# Patient Record
Sex: FEMALE | Race: WHITE | NOT HISPANIC OR LATINO | URBAN - METROPOLITAN AREA
[De-identification: names, ages, dates, MRNs, and addresses within clinical notes are randomized per-mention and may not be internally consistent; named-entity substitution may affect disease eponyms.]

---

## 2017-09-01 ENCOUNTER — EMERGENCY (EMERGENCY)
Facility: HOSPITAL | Age: 62
LOS: 1 days | Discharge: HOME | End: 2017-09-01
Attending: EMERGENCY MEDICINE | Admitting: EMERGENCY MEDICINE
Payer: COMMERCIAL

## 2017-09-01 VITALS
DIASTOLIC BLOOD PRESSURE: 73 MMHG | OXYGEN SATURATION: 98 % | TEMPERATURE: 98 F | HEART RATE: 99 BPM | RESPIRATION RATE: 18 BRPM | SYSTOLIC BLOOD PRESSURE: 115 MMHG

## 2017-09-01 PROCEDURE — 99283 EMERGENCY DEPT VISIT LOW MDM: CPT

## 2017-09-01 NOTE — ED ADULT NURSE NOTE - OBJECTIVE STATEMENT
62y female presents to the ER c/o left ankle pain. Pt is a&ox4 and speaking coherently. Pt denies trauma to the area, slight redness noted to medial area of ankle. Pt can walk on ankle with pain. Pt denies tingling or numbness, positive pulses in extremities. safety maintained.

## 2017-09-02 VITALS
OXYGEN SATURATION: 97 % | RESPIRATION RATE: 18 BRPM | HEART RATE: 72 BPM | DIASTOLIC BLOOD PRESSURE: 75 MMHG | SYSTOLIC BLOOD PRESSURE: 115 MMHG | TEMPERATURE: 98 F

## 2017-09-02 RX ORDER — CEPHALEXIN 500 MG
1 CAPSULE ORAL
Qty: 28 | Refills: 0 | OUTPATIENT
Start: 2017-09-02 | End: 2017-09-09

## 2017-09-02 RX ORDER — CEPHALEXIN 500 MG
500 CAPSULE ORAL ONCE
Qty: 0 | Refills: 0 | Status: COMPLETED | OUTPATIENT
Start: 2017-09-02 | End: 2017-09-02

## 2017-09-02 RX ORDER — AZTREONAM 2 G
1 VIAL (EA) INJECTION
Qty: 14 | Refills: 0 | OUTPATIENT
Start: 2017-09-02 | End: 2017-09-09

## 2017-09-02 RX ADMIN — Medication 1 TABLET(S): at 00:52

## 2017-09-02 RX ADMIN — Medication 500 MILLIGRAM(S): at 00:52

## 2017-09-02 NOTE — ED PROVIDER NOTE - OBJECTIVE STATEMENT
61yo female pt, ambulatory, no PMHx c/o right buttock abscess for 3days and noticed left ankle pain/ red since last night. Pt stated she had the same buttock infection (was told skin infection by PMD) last year and Txed. Denies fever, chills or recent cold symptoms. Denies CP/SOB/ABD pain or N/V/D. Denies injuries or bites. Denies sensory changes or weakness to extremities.

## 2017-09-02 NOTE — ED PROVIDER NOTE - ATTENDING CONTRIBUTION TO CARE
Attending MD Yates: I personally have seen and examined this patient.  NP note reviewed and agree on plan of care and except where noted.  See below for details.     62F with no reported PMH presents to the ED with R buttock abscess and L ankle pain.  Reports that she had previous episode of R buttock abscess last year and was treated by PMD.  Reports symptoms started three days ago.  Also reports has L medial ankle pain.  Able to ambulate.  Reports pain with pressure type movement.  Denies stings or bites, recent travel.  Denies fevers, chills, dizziness, weakness.  Denies trauma. Denies chest pain, shortness of breath, palpitations. Denies abdominal pain, nausea, vomiting, diarrhea, blood in stools. +tobacco.  On exam, NAD, head NCAT, PERRL, FROM at neck, no tenderness to palpation or stepoffs along length of spine, lungs CTAB with good inspiratory effort, +S1S2, no m/r/g, abdomen soft with +BS, NT, ND, no CVAT, moving all extremities with 5/5 strength bilateral upper and lower extremities, good and equal  strength bilaterally, + R buttock with induration, no fluctuance, +erythema, smaller areas of induration near main area of induration all with blanching erythema and warmth, no drainable collection palpated, +L medial ankle with area of ?excoriation, small area of cellulitis, FROM at L ankle, no fluctuance, +blanching erythema; A/P: 62F with cellulitis of L ankle and R buttock, will discharge with Keflex and Bactrim, to return for close follow up for wound check, Follow up instructions given, importance of follow up emphasized, return to ED parameters reviewed and patient verbalized understanding.  All questions answered, all concerns addressed.

## 2017-09-02 NOTE — ED PROVIDER NOTE - PHYSICAL EXAMINATION
NAD, VSS, Afebrile, ABD soft, no CVA tender, + Right buttock abscess with firm induration, Multiple small pusy rashes, not fluctuating, cellulitis around wound. no rectal tender, + left medial ankle superficial cellulitis, tender, warmth with intact skin, no induration or obvious swelling.

## 2017-09-03 ENCOUNTER — EMERGENCY (EMERGENCY)
Facility: HOSPITAL | Age: 62
LOS: 1 days | Discharge: HOME | End: 2017-09-03
Admitting: EMERGENCY MEDICINE
Payer: COMMERCIAL

## 2017-09-03 VITALS
RESPIRATION RATE: 18 BRPM | DIASTOLIC BLOOD PRESSURE: 72 MMHG | SYSTOLIC BLOOD PRESSURE: 116 MMHG | TEMPERATURE: 98 F | HEART RATE: 75 BPM | OXYGEN SATURATION: 95 %

## 2017-09-03 NOTE — ED ADULT NURSE NOTE - OBJECTIVE STATEMENT
62y female pt arrived to ED for right buttock wound check. Abscess was drained on 9/1/17 and no bacteria growth was found from culture. No fever/chills, no drainage or bleeding noted at site, no redness. Pt also states that she found some redness noted on left ankle. No pain/discomfort.

## 2017-09-03 NOTE — ED PROVIDER NOTE - ATTENDING CONTRIBUTION TO CARE
ATTENDING MD: I, Gaston Dumont, have seen and evaluated this patient with the advance practice clinician.  I have discussed the history, exam ,and aspects of care with the APC.  I have reviewed the APC's note. I agree with the findings  unless other wise stated in the HPI, PE, MDM, and progress notes.    GEN: well appearing  HEENT: mucus membranes are moist, oropharynx is within normal limits  CV: normal rate, regular rhythm  SKIN: R-buttock wound healing well, minimal erythema, no drainage. L-ankle erythema decreased from marker point, minimally warm, nontender    MDM: improving signs of infection c/w current antibiotic course, f/u with PCP.

## 2017-09-03 NOTE — ED PROVIDER NOTE - OBJECTIVE STATEMENT
63yo female pt, ambulatory c/o wound check for right buttock abscess/ cellulitis and left ankle cellulitis. Pt's seen in ED and on Bactrim/ Keflex. Pt stated feeling better and pain's improved. NO fever or chills. No headache or bodyaches.

## 2017-09-03 NOTE — ED PROVIDER NOTE - PHYSICAL EXAMINATION
NAD, VSS, Afebrile, + right buttock diminished eryth and induration, no fluctuating swelling palpable, no drainage. + left ankle eryth with mild tender/ swelling to medial aspect. full ROMs of ankle.

## 2017-09-04 LAB
CULTURE RESULTS: SIGNIFICANT CHANGE UP
SPECIMEN SOURCE: SIGNIFICANT CHANGE UP

## 2017-11-24 ENCOUNTER — EMERGENCY (EMERGENCY)
Facility: HOSPITAL | Age: 62
LOS: 1 days | Discharge: HOME | End: 2017-11-24
Attending: EMERGENCY MEDICINE
Payer: COMMERCIAL

## 2017-11-24 VITALS
DIASTOLIC BLOOD PRESSURE: 71 MMHG | SYSTOLIC BLOOD PRESSURE: 127 MMHG | TEMPERATURE: 97 F | HEART RATE: 67 BPM | HEIGHT: 66 IN | RESPIRATION RATE: 20 BRPM | WEIGHT: 164.91 LBS | OXYGEN SATURATION: 100 %

## 2017-11-24 DIAGNOSIS — H62.42 OTITIS EXTERNA IN OTHER DISEASES CLASSIFIED ELSEWHERE, LEFT EAR: ICD-10-CM

## 2017-11-24 DIAGNOSIS — H92.02 OTALGIA, LEFT EAR: ICD-10-CM

## 2017-11-24 DIAGNOSIS — B36.9 SUPERFICIAL MYCOSIS, UNSPECIFIED: ICD-10-CM

## 2017-11-24 PROCEDURE — 99283 EMERGENCY DEPT VISIT LOW MDM: CPT

## 2017-11-24 PROCEDURE — 99284 EMERGENCY DEPT VISIT MOD MDM: CPT

## 2017-11-24 NOTE — ED PROVIDER NOTE - MEDICAL DECISION MAKING DETAILS
63 y/o F pt w/ L ear pain x yesterday. Pt is afebrile. Vital signs within normal limits. Hx and findings consistent w/ fungal otitis media. Will give otic drops and discharge w/ follow up. 63 y/o F pt w/ L ear pain x yesterday. Pt is afebrile. Vital signs within normal limits. Hx and findings consistent w/ fungal otitis externa. Will give otic drops and discharge w/ follow up.

## 2017-11-24 NOTE — ED ADULT NURSE NOTE - OBJECTIVE STATEMENT
C/O LEFT EAR PAIN , INFECTION SINCE YESTERDAY. SEEN AND EXAMINED BY FREDRICK NP. C/O LEFT EAR PAIN , INFECTION SINCE YESTERDAY. SEEN AND EXAMINED BY NEPLOCH NP.REFUSED REPEAT VS.

## 2017-11-24 NOTE — ED PROVIDER NOTE - PROGRESS NOTE DETAILS
History and findings consistent with otomycosis. Will treat with otic clotrimazole and PMD follow up in 5 days. Pt is well appearing walking with steady gait, stable for discharge and follow up without fail with medical doctor. I had a detailed discussion with the patient and/or guardian regarding the historical points, exam findings, and any diagnostic results supporting the discharge diagnosis. Pt educated on care and need for follow up. Strict return instructions and red flag signs and symptoms discussed with patient. Questions answered. Pt shows understanding of discharge information and agrees to follow.

## 2017-11-24 NOTE — ED PROVIDER NOTE - OBJECTIVE STATEMENT
63 y/o F pt w/ a significant PMHx presents to the ED c/o L ear pain since last night. Pt describes pain as started gradually, intermittent, and itchy w/ water coming out of the ear; admits relief w/ Advil but states that when the drug wears off the pain becomes worse.  Pt denies  HA, sore throat, body aches, fever, cough, or any other complaints. NKDA.

## 2023-06-29 NOTE — ED PROVIDER NOTE - CONDUCTED A DETAILED DISCUSSION WITH PATIENT AND/OR GUARDIAN REGARDING, MDM
return to ED if symptoms worsen, persist or questions arise/need for outpatient follow-up Burow's Graft Text: The defect edges were debeveled with a #15 scalpel blade.  Given the location of the defect, shape of the defect, the proximity to free margins and the presence of a standing cone deformity a Burow's skin graft was deemed most appropriate. The standing cone was removed and this tissue was then trimmed to the shape of the primary defect. The adipose tissue was also removed until only dermis and epidermis were left.  The skin margins of the secondary defect were undermined to an appropriate distance in all directions utilizing iris scissors.  The secondary defect was closed with interrupted buried subcutaneous sutures.  The skin edges were then re-apposed with running  sutures.  The skin graft was then placed in the primary defect and oriented appropriately.
